# Patient Record
Sex: FEMALE | Race: BLACK OR AFRICAN AMERICAN | NOT HISPANIC OR LATINO | Employment: OTHER | URBAN - NONMETROPOLITAN AREA
[De-identification: names, ages, dates, MRNs, and addresses within clinical notes are randomized per-mention and may not be internally consistent; named-entity substitution may affect disease eponyms.]

---

## 2021-04-03 ENCOUNTER — OFFICE VISIT (OUTPATIENT)
Dept: INTERNAL MEDICINE | Facility: CLINIC | Age: 51
End: 2021-04-03

## 2021-04-03 VITALS
HEART RATE: 65 BPM | HEIGHT: 67 IN | WEIGHT: 192.6 LBS | DIASTOLIC BLOOD PRESSURE: 58 MMHG | OXYGEN SATURATION: 98 % | BODY MASS INDEX: 30.23 KG/M2 | TEMPERATURE: 97.8 F | SYSTOLIC BLOOD PRESSURE: 122 MMHG

## 2021-04-03 DIAGNOSIS — R06.2 WHEEZING: ICD-10-CM

## 2021-04-03 DIAGNOSIS — Z82.69: ICD-10-CM

## 2021-04-03 DIAGNOSIS — R13.10 DYSPHAGIA, UNSPECIFIED TYPE: ICD-10-CM

## 2021-04-03 DIAGNOSIS — R74.8 ELEVATED LIVER ENZYMES: Primary | ICD-10-CM

## 2021-04-03 DIAGNOSIS — R05.9 COUGH: ICD-10-CM

## 2021-04-03 PROCEDURE — 99204 OFFICE O/P NEW MOD 45 MIN: CPT | Performed by: FAMILY MEDICINE

## 2021-04-03 RX ORDER — MULTIPLE VITAMINS W/ MINERALS TAB 9MG-400MCG
1 TAB ORAL DAILY
COMMUNITY

## 2021-04-03 RX ORDER — FOLIC ACID 0.8 MG
TABLET ORAL
COMMUNITY

## 2021-04-03 NOTE — PROGRESS NOTES
Viviane Ba is a 51 y.o. female.    Chief Complaint   Patient presents with   • Liver Eval     Pt had elevated liver enzymes on her last set of lab work        HPI   Patient presents today to establish care.  Patient was previously scheduled to have copper IUD removed with D&C next week.  On pre-op labs cholesterol was a little elevated.  Liver enzymes were a little elevated as well. She denies RUQ pain, nausea, vomiting, reflux heartburn.  She follows a primarily vegetarian diet.  She does not take excessive amounts of Tylenol or any other particular medications.  She takes vitamin D, magnesium, multivitamin, and probiotics.  She does report a mild respiratory infection within the last week that seems to be improving.  She does admit to taking quite a bit of cough syrup and acetaminophen, but does not believe that she has overdosed on these medications.  Her procedure has now been pushed back to May 13.  Patient reports that gynecology would like to have medical clearance before proceeding with a D&C.    In addition, patient's mother does have a history of polymyositis.  Patient reports that mother passed secondary to complications of this disease.  Patient does have some joint aches and pains.  She also complains of occasional difficulty swallowing.  She reports that her brother has this issue as well.  She has never been screened for any autoimmune disorders.    The following portions of the patient's history were reviewed and updated as appropriate: allergies, current medications, past family history, past medical history, past social history, past surgical history and problem list.     Past Medical History:   Diagnosis Date   • Asthma     Childhood        Past Surgical History:   Procedure Laterality Date   • COLONOSCOPY      no polyps   • LEEP      cryo   • WISDOM TOOTH EXTRACTION         Family History   Problem Relation Age of Onset   • Diabetes Mother    • Polymyositis Mother    • Diabetes Father    •  Heart disease Father         CHF at age 50   • Hypertension Father    • Hyperlipidemia Father    • Kidney failure Father         secondary to medications   • Breast cancer Maternal Grandmother        Social History     Socioeconomic History   • Marital status:      Spouse name: Not on file   • Number of children: Not on file   • Years of education: Not on file   • Highest education level: Not on file   Tobacco Use   • Smoking status: Never Smoker   • Smokeless tobacco: Never Used   Substance and Sexual Activity   • Alcohol use: Yes     Comment: Occasional    • Drug use: Never   • Sexual activity: Defer       Allergies   Allergen Reactions   • Sulfa Antibiotics Rash     As a child         Current Outpatient Medications:   •  Cholecalciferol (DIALYVITE VITAMIN D 5000 PO), Take 10,000 Units by mouth., Disp: , Rfl:   •  Magnesium 500 MG capsule, Take  by mouth., Disp: , Rfl:   •  multivitamin with minerals (MULTIVITAMIN ADULT PO), Take 1 tablet by mouth Daily., Disp: , Rfl:   •  Probiotic Product (PROBIOTIC ADVANCED PO), Take  by mouth., Disp: , Rfl:     ROS    Review of Systems   Constitutional: Positive for unexpected weight gain. Negative for chills, fatigue and fever.   HENT: Negative for congestion, postnasal drip and sore throat.    Eyes: Negative for blurred vision and visual disturbance.   Respiratory: Positive for cough. Negative for shortness of breath and wheezing.    Cardiovascular: Negative for chest pain and leg swelling.   Gastrointestinal: Negative for abdominal pain, constipation, diarrhea, nausea and vomiting.   Endocrine: Positive for heat intolerance. Negative for cold intolerance.   Genitourinary: Positive for menstrual problem. Negative for dysuria and frequency.   Musculoskeletal: Positive for arthralgias (right hand). Negative for back pain.   Skin: Negative for color change and rash.   Allergic/Immunologic: Negative for environmental allergies.   Neurological: Negative for weakness,  "numbness and headache.   Hematological: Does not bruise/bleed easily.   Psychiatric/Behavioral: Negative for depressed mood. The patient is not nervous/anxious.        Vitals:    04/03/21 1409   BP: 122/58   BP Location: Left arm   Patient Position: Sitting   Cuff Size: Adult   Pulse: 65   Temp: 97.8 °F (36.6 °C)   TempSrc: Temporal   SpO2: 98%   Weight: 87.4 kg (192 lb 9.6 oz)   Height: 170.2 cm (67\")     Body mass index is 30.17 kg/m².    Physical Exam     Physical Exam  Constitutional:       General: She is not in acute distress.     Appearance: Normal appearance. She is well-developed.   HENT:      Head: Normocephalic and atraumatic.      Right Ear: Tympanic membrane and external ear normal.      Left Ear: Tympanic membrane and external ear normal.   Eyes:      Extraocular Movements: Extraocular movements intact.      Conjunctiva/sclera: Conjunctivae normal.      Pupils: Pupils are equal, round, and reactive to light.   Cardiovascular:      Rate and Rhythm: Normal rate and regular rhythm.      Heart sounds: No murmur heard.     Pulmonary:      Effort: Pulmonary effort is normal. No respiratory distress.      Breath sounds: Wheezing (Right upper lobe) present.   Abdominal:      General: Bowel sounds are normal. There is no distension.      Palpations: Abdomen is soft.      Tenderness: There is no abdominal tenderness.   Musculoskeletal:      Cervical back: Normal range of motion and neck supple.      Right lower leg: No edema.      Left lower leg: No edema.      Comments: Wrist brace present to right hand   Lymphadenopathy:      Cervical: No cervical adenopathy.   Skin:     General: Skin is warm and dry.      Findings: No rash.   Neurological:      Mental Status: She is alert and oriented to person, place, and time.      Cranial Nerves: No cranial nerve deficit.      Deep Tendon Reflexes: Reflexes normal.   Psychiatric:         Mood and Affect: Mood normal.         Behavior: Behavior normal. "         Assessment/Plan    Problem List Items Addressed This Visit     None      Visit Diagnoses     Elevated liver enzymes    -  Primary    Relevant Orders    Comprehensive Metabolic Panel    Hepatitis Panel, Acute    Family history of polymyositis        Relevant Orders    JORGE With / DsDNA, RNP, Sjogrens A / B, Lei    Wheezing        Relevant Orders    XR Chest PA & Lateral    Cough                Will obtain updated liver enzymes as well as an acute hepatitis panel.  Discussed that it sounds as if her liver enzymes are very minimally elevated.  Discussed that this may be fatty liver disease and very likely benign.  We will also obtain an JORGE level to screen for potential autoimmune disorder.  Discussed that dysphagia can very well be genetic.  As patient does have wheezing present on exam, will proceed with a chest x-ray to rule out underlying bronchitis and pneumonia as well.  She is due for her second Covid vaccine in less than a week.  Will obtain records from previous providers.  Patient reports that her gynecologist has records of everything from [place].  Patient is up-to-date on mammogram, Pap smear, and colonoscopy.      No orders of the defined types were placed in this encounter.      No orders of the defined types were placed in this encounter.      Return for Annual.      Tiffany Grimes,

## 2021-04-05 ENCOUNTER — HOSPITAL ENCOUNTER (OUTPATIENT)
Dept: GENERAL RADIOLOGY | Facility: HOSPITAL | Age: 51
Discharge: HOME | End: 2021-04-05
Admitting: FAMILY MEDICINE

## 2021-04-05 PROCEDURE — 71046 X-RAY EXAM CHEST 2 VIEWS: CPT

## 2021-04-06 LAB
ALBUMIN SERPL-MCNC: 4.3 G/DL (ref 3.5–5.2)
ALBUMIN/GLOB SERPL: 1.9 G/DL
ALP SERPL-CCNC: 107 U/L (ref 39–117)
ALT SERPL-CCNC: 42 U/L (ref 1–33)
ANA SER QL: NEGATIVE
AST SERPL-CCNC: 36 U/L (ref 1–32)
BILIRUB SERPL-MCNC: 0.3 MG/DL (ref 0–1.2)
BUN SERPL-MCNC: 10 MG/DL (ref 6–20)
BUN/CREAT SERPL: 13.7 (ref 7–25)
CALCIUM SERPL-MCNC: 9.9 MG/DL (ref 8.6–10.5)
CHLORIDE SERPL-SCNC: 105 MMOL/L (ref 98–107)
CO2 SERPL-SCNC: 24.4 MMOL/L (ref 22–29)
CREAT SERPL-MCNC: 0.73 MG/DL (ref 0.57–1)
GLOBULIN SER CALC-MCNC: 2.3 GM/DL
GLUCOSE SERPL-MCNC: 83 MG/DL (ref 65–99)
HAV IGM SERPL QL IA: NEGATIVE
HBV CORE IGM SERPL QL IA: NEGATIVE
HBV SURFACE AG SERPL QL IA: NEGATIVE
HCV AB S/CO SERPL IA: <0.1 S/CO RATIO (ref 0–0.9)
POTASSIUM SERPL-SCNC: 4.5 MMOL/L (ref 3.5–5.2)
PROT SERPL-MCNC: 6.6 G/DL (ref 6–8.5)
SODIUM SERPL-SCNC: 141 MMOL/L (ref 136–145)

## 2021-04-20 ENCOUNTER — TELEPHONE (OUTPATIENT)
Dept: INTERNAL MEDICINE | Facility: CLINIC | Age: 51
End: 2021-04-20

## 2021-04-20 NOTE — TELEPHONE ENCOUNTER
Caller: Viviane Jeronimo    Relationship: Self    Best call back number: [phone]    What is the best time to reach you: ANYTIME    Who are you requesting to speak with (clinical staff, provider,  specific staff member): CLINICAL      What was the call regarding: PT CALLED REQUESTING PROCEDURE CLEARANCE LETTER BE SENT TO DR. IBANEZ  PHONE NUMBER: [phone]      Do you require a callback: YES

## 2021-04-20 NOTE — TELEPHONE ENCOUNTER
Patient states she already completed a preop visit on April 3rd and had labs done. She stated dr montilla had drafted a clearance letter and needs this and her labs forwarded to dr brown office.

## 2021-04-21 NOTE — TELEPHONE ENCOUNTER
It was not a preop exam.  That was not how the visit was scheduled, nor was it stated she needed an actual preop clearance exam.  She reported pre op labs were done by the specialist and she was concerned about liver function tests being elevated.  This was repeated and stated in a letter that I do not feel concerned with the slight elevation of liver enzymes and should not interfere with her ability to undergo surgery.  We can fax the letter, though already supplied to the patient, and the labs that I ordered.  However, if they are needing a pre op exam done with specific testing, this may have to be done at a separate appointment.

## 2021-07-19 ENCOUNTER — OFFICE VISIT (OUTPATIENT)
Dept: INTERNAL MEDICINE | Facility: CLINIC | Age: 51
End: 2021-07-19

## 2021-07-19 VITALS
HEART RATE: 81 BPM | HEIGHT: 67 IN | OXYGEN SATURATION: 98 % | SYSTOLIC BLOOD PRESSURE: 126 MMHG | BODY MASS INDEX: 29.29 KG/M2 | RESPIRATION RATE: 16 BRPM | TEMPERATURE: 98.8 F | DIASTOLIC BLOOD PRESSURE: 80 MMHG | WEIGHT: 186.6 LBS

## 2021-07-19 DIAGNOSIS — K21.9 GASTROESOPHAGEAL REFLUX DISEASE, UNSPECIFIED WHETHER ESOPHAGITIS PRESENT: ICD-10-CM

## 2021-07-19 DIAGNOSIS — R13.10 DYSPHAGIA, UNSPECIFIED TYPE: Primary | ICD-10-CM

## 2021-07-19 PROCEDURE — 99213 OFFICE O/P EST LOW 20 MIN: CPT | Performed by: FAMILY MEDICINE

## 2021-07-19 RX ORDER — OMEPRAZOLE 20 MG/1
20 CAPSULE, DELAYED RELEASE ORAL DAILY
Qty: 90 CAPSULE | Refills: 1 | Status: SHIPPED | OUTPATIENT
Start: 2021-07-19 | End: 2022-06-23

## 2021-07-19 NOTE — PROGRESS NOTES
Viviane Contreras is a 51 y.o. female.    Chief Complaint   Patient presents with   • Difficulty Swallowing     pt reports that this has been ongoing for years and pt also has family members that have the same issues   • Referral - Donor Txp     pt would like to be referred to Dr Parul Shook [facility]       HPI   Patient reports trouble swallowing that has become more pronounced over the last few months.  She reports every time she eats she feels as if food is getting stuck.   She Last week she reports she woke up and felt like she aspirated.  Not taking anything for this issue.  She rarely takes something OTC.  Denies trouble liquids except when she is having trouble swallowing solids already.  She reports dry spices tend to trigger.  She states multiple family members have had to have their esophagus stretched.  She does report upper belly bloating.  She would like to see GI for possible scope.     The following portions of the patient's history were reviewed and updated as appropriate: allergies, current medications, past family history, past medical history, past social history, past surgical history and problem list.     Allergies   Allergen Reactions   • Sulfa Antibiotics Rash     As a child         Current Outpatient Medications:   •  Cholecalciferol (DIALYVITE VITAMIN D 5000 PO), Take 10,000 Units by mouth., Disp: , Rfl:   •  Magnesium 500 MG capsule, Take  by mouth., Disp: , Rfl:   •  multivitamin with minerals (MULTIVITAMIN ADULT PO), Take 1 tablet by mouth Daily., Disp: , Rfl:   •  Probiotic Product (PROBIOTIC ADVANCED PO), Take  by mouth., Disp: , Rfl:   •  omeprazole (PrilOSEC) 20 MG capsule, Take 1 capsule by mouth Daily., Disp: 90 capsule, Rfl: 1    ROS    Review of Systems   Constitutional: Negative for chills and fever.   Respiratory: Negative for shortness of breath.    Cardiovascular: Negative for chest pain.   Gastrointestinal: Positive for vomiting (induced) and GERD. Negative for  "abdominal pain, constipation, diarrhea and nausea.       Vitals:    07/19/21 1039   BP: 126/80   BP Location: Left arm   Patient Position: Sitting   Cuff Size: Adult   Pulse: 81   Resp: 16   Temp: 98.8 °F (37.1 °C)   TempSrc: Temporal   SpO2: 98%   Weight: 84.6 kg (186 lb 9.6 oz)   Height: 170.2 cm (67\")     Body mass index is 29.23 kg/m².    Physical Exam     Physical Exam  Constitutional:       General: She is not in acute distress.     Appearance: Normal appearance. She is well-developed.   HENT:      Head: Normocephalic and atraumatic.      Right Ear: External ear normal.      Left Ear: External ear normal.   Eyes:      Extraocular Movements: Extraocular movements intact.      Conjunctiva/sclera: Conjunctivae normal.   Cardiovascular:      Rate and Rhythm: Normal rate and regular rhythm.      Heart sounds: No murmur heard.     Pulmonary:      Effort: Pulmonary effort is normal. No respiratory distress.      Breath sounds: Normal breath sounds. No wheezing.   Abdominal:      General: Bowel sounds are normal. There is no distension.      Palpations: Abdomen is soft.      Tenderness: There is no abdominal tenderness.   Skin:     General: Skin is warm and dry.   Neurological:      Mental Status: She is alert and oriented to person, place, and time.      Cranial Nerves: No cranial nerve deficit.   Psychiatric:         Mood and Affect: Mood normal.         Behavior: Behavior normal.         Assessment/Plan    Problems Addressed this Visit     None      Visit Diagnoses     Dysphagia, unspecified type    -  Primary    Relevant Orders    Ambulatory Referral to Gastroenterology (Completed)    Gastroesophageal reflux disease, unspecified whether esophagitis present        Relevant Medications    omeprazole (PrilOSEC) 20 MG capsule    Other Relevant Orders    Ambulatory Referral to Gastroenterology (Completed)        Patient is being referred to gastroenterology for further evaluation. In the meantime, we will go ahead and " start the patient on Prilosec.    New Medications Ordered This Visit   Medications   • omeprazole (PrilOSEC) 20 MG capsule     Sig: Take 1 capsule by mouth Daily.     Dispense:  90 capsule     Refill:  1       No orders of the defined types were placed in this encounter.      Return in about 3 months (around 10/19/2021) for Annual.    Tiffany Grimes, DO

## 2021-10-20 ENCOUNTER — OFFICE VISIT (OUTPATIENT)
Dept: INTERNAL MEDICINE | Facility: CLINIC | Age: 51
End: 2021-10-20

## 2021-10-20 VITALS
TEMPERATURE: 98 F | HEIGHT: 67 IN | SYSTOLIC BLOOD PRESSURE: 120 MMHG | BODY MASS INDEX: 30.17 KG/M2 | WEIGHT: 192.2 LBS | OXYGEN SATURATION: 98 % | DIASTOLIC BLOOD PRESSURE: 82 MMHG | HEART RATE: 74 BPM

## 2021-10-20 DIAGNOSIS — Z13.220 LIPID SCREENING: ICD-10-CM

## 2021-10-20 DIAGNOSIS — Z00.00 WELL ADULT EXAM: Primary | ICD-10-CM

## 2021-10-20 PROCEDURE — 90715 TDAP VACCINE 7 YRS/> IM: CPT | Performed by: FAMILY MEDICINE

## 2021-10-20 PROCEDURE — 90471 IMMUNIZATION ADMIN: CPT | Performed by: FAMILY MEDICINE

## 2021-10-20 PROCEDURE — 99396 PREV VISIT EST AGE 40-64: CPT | Performed by: FAMILY MEDICINE

## 2021-10-20 NOTE — ASSESSMENT & PLAN NOTE
Unremarkable PE findings.  Discussed routine health maintenance including:  Vaccines, dental/eye health, health diet and exercise, pap smear, mammograms, colorectal cancer screening. Mental health addressed today as well.  Tdap administered.  Encouraged shingrix vaccine to be given at her local pharmacy.  Will obtain record of mammogram from gynecology.  Reviewed pap results and recent labs from gynecology.

## 2021-10-20 NOTE — PROGRESS NOTES
Viviane Contreras is a 51 y.o. female.    Chief Complaint   Patient presents with   • Annual Exam       HPI   Patient presents today for annual physical exam.  She recently had an EDG and colonoscopy.  Patient had polyp removed from colon.  EGD did show schlatzki's ring and hiatal hernia.  Reflux had resolved with daily omeprazole.   Has had 2 COVID vaccines.  Declines flu vaccine.  Agreeable to tdap.  Not receive shingrix vaccine yet.   Had recent eye exam.  Up to date on dental exams.  Going to have tooth extracted.  She is exercising.  She is trying to lose about 20lbs.  She eating healthy. She is wanting to have a COVID antibody test. She is up to date on pap smears and mammogram through Dr. Tai's office, which were done earlier this year.  She is menopausal and has been having hot flashes.  Also reports mid back pain and is seeing a chiropractor and massage therapist with some improvement.     The following portions of the patient's history were reviewed and updated as appropriate: allergies, current medications, past family history, past medical history, past social history, past surgical history and problem list.     Past Medical History:   Diagnosis Date   • Asthma     Childhood        Past Surgical History:   Procedure Laterality Date   • COLONOSCOPY      no polyps   • LEEP      cryo   • WISDOM TOOTH EXTRACTION         Family History   Problem Relation Age of Onset   • Diabetes Mother    • Polymyositis Mother    • Diabetes Father    • Heart disease Father         CHF at age 50   • Hypertension Father    • Hyperlipidemia Father    • Kidney failure Father         secondary to medications   • Breast cancer Maternal Grandmother        Social History     Socioeconomic History   • Marital status:    Tobacco Use   • Smoking status: Never Smoker   • Smokeless tobacco: Never Used   Vaping Use   • Vaping Use: Never used   Substance and Sexual Activity   • Alcohol use: Yes     Comment: Occasional    •  "Drug use: Never   • Sexual activity: Defer       Allergies   Allergen Reactions   • Sulfa Antibiotics Rash     As a child         Current Outpatient Medications:   •  Cholecalciferol (DIALYVITE VITAMIN D 5000 PO), Take 10,000 Units by mouth., Disp: , Rfl:   •  Magnesium 500 MG capsule, Take  by mouth., Disp: , Rfl:   •  multivitamin with minerals (MULTIVITAMIN ADULT PO), Take 1 tablet by mouth Daily., Disp: , Rfl:   •  omeprazole (PrilOSEC) 20 MG capsule, Take 1 capsule by mouth Daily., Disp: 90 capsule, Rfl: 1  •  Probiotic Product (PROBIOTIC ADVANCED PO), Take  by mouth., Disp: , Rfl:     ROS    Review of Systems   Constitutional: Negative for chills, fatigue and fever.   HENT: Negative for congestion, postnasal drip and sore throat.    Eyes: Negative for blurred vision and visual disturbance.   Respiratory: Negative for cough and shortness of breath.    Cardiovascular: Negative for chest pain and leg swelling.   Gastrointestinal: Negative for abdominal pain, constipation, diarrhea, nausea and vomiting.   Endocrine: Positive for heat intolerance. Negative for cold intolerance.   Genitourinary: Negative for dysuria and frequency.        Menopausal syndrome   Musculoskeletal: Positive for back pain.   Skin: Negative for color change and rash.   Allergic/Immunologic: Negative for environmental allergies.   Neurological: Negative for weakness, numbness and headache.   Hematological: Does not bruise/bleed easily.   Psychiatric/Behavioral: Negative for depressed mood. The patient is not nervous/anxious.        Vitals:    10/20/21 1043   BP: 120/82   Pulse: 74   Temp: 98 °F (36.7 °C)   SpO2: 98%   Weight: 87.2 kg (192 lb 3.2 oz)   Height: 170.2 cm (67\")   PainSc: 0-No pain     Body mass index is 30.1 kg/m².    Physical Exam     Physical Exam  Constitutional:       General: She is not in acute distress.     Appearance: Normal appearance. She is well-developed.   HENT:      Head: Normocephalic and atraumatic.      Right " Ear: Tympanic membrane and external ear normal.      Left Ear: Tympanic membrane and external ear normal.   Eyes:      Extraocular Movements: Extraocular movements intact.      Conjunctiva/sclera: Conjunctivae normal.      Pupils: Pupils are equal, round, and reactive to light.   Cardiovascular:      Rate and Rhythm: Normal rate and regular rhythm.      Heart sounds: No murmur heard.      Pulmonary:      Effort: Pulmonary effort is normal. No respiratory distress.      Breath sounds: Normal breath sounds. No wheezing.   Abdominal:      General: Bowel sounds are normal. There is no distension.      Palpations: Abdomen is soft.      Tenderness: There is no abdominal tenderness.   Musculoskeletal:      Cervical back: Normal range of motion and neck supple.      Right lower leg: No edema.      Left lower leg: No edema.      Comments: Somatic dysfunction of lower thoracic spine (group dysfunction)   Lymphadenopathy:      Cervical: No cervical adenopathy.   Skin:     General: Skin is warm and dry.   Neurological:      Mental Status: She is alert and oriented to person, place, and time.      Cranial Nerves: No cranial nerve deficit.   Psychiatric:         Mood and Affect: Mood normal.         Behavior: Behavior normal.         Assessment/Plan    Problems Addressed this Visit        Health Encounters    Well adult exam - Primary     Unremarkable PE findings.  Discussed routine health maintenance including:  Vaccines, dental/eye health, health diet and exercise, pap smear, mammograms, colorectal cancer screening. Mental health addressed today as well.  Tdap administered.  Encouraged shingrix vaccine to be given at her local pharmacy.  Will obtain record of mammogram from gynecology.  Reviewed pap results and recent labs from gynecology.            Other Visit Diagnoses     Lipid screening        Relevant Orders    Lipid Panel        No orders of the defined types were placed in this encounter.      Orders Placed This  Encounter   Procedures   • Tdap Vaccine Greater Than or Equal To 6yo IM       Return in about 1 year (around 10/20/2022) for Annual physical.      Tiffany Grimes, DO

## 2021-10-21 LAB
CHOLEST SERPL-MCNC: 202 MG/DL (ref 0–200)
HDLC SERPL-MCNC: 51 MG/DL (ref 40–60)
LDLC SERPL CALC-MCNC: 123 MG/DL (ref 0–100)
TRIGL SERPL-MCNC: 160 MG/DL (ref 0–150)
VLDLC SERPL CALC-MCNC: 28 MG/DL (ref 5–40)

## 2021-11-11 ENCOUNTER — LAB REQUISITION (OUTPATIENT)
Dept: LAB | Facility: HOSPITAL | Age: 51
End: 2021-11-11

## 2021-11-11 DIAGNOSIS — M65.30 TRIGGER FINGER, UNSPECIFIED FINGER: ICD-10-CM

## 2021-11-11 PROCEDURE — 88307 TISSUE EXAM BY PATHOLOGIST: CPT | Performed by: PLASTIC SURGERY

## 2021-11-12 LAB
CYTO UR: NORMAL
LAB AP CASE REPORT: NORMAL
LAB AP CLINICAL INFORMATION: NORMAL
PATH REPORT.FINAL DX SPEC: NORMAL
PATH REPORT.GROSS SPEC: NORMAL

## 2022-02-08 ENCOUNTER — TELEPHONE (OUTPATIENT)
Dept: INTERNAL MEDICINE | Facility: CLINIC | Age: 52
End: 2022-02-08

## 2022-02-08 NOTE — TELEPHONE ENCOUNTER
Caller: Viviane Jeronimo    Relationship: Self    Best call back number: [phone]    What orders are you requesting (i.e. lab or imaging): CERVICAL MRI    In what timeframe would the patient need to come in: ASAP    Additional notes: PATIENT SAW DR KNAPP AND DR KNAPP SENT HER TO NEUROLOGY. NEUROLOGY NOW RECOMMENDS CERVICAL MRI. NEUROLOGIST IS SENDING OVER ALL REPORTS ON THE REASON WHY PATIENT NEEDS THIS MRI. PATIENT THOUGHT THAT NEUROLOGIST WAS ORDERING THE MRI SO THIS HAS GONE OVER A WEEK AND JUST FOUND OUT THAT DR LUCAS NEEDS TO ORDER THIS. PATIENT NEEDS THIS MRI ASAP. PLEASE CALL PATIENT WHEN ORDERED AND SCHEDULED.

## 2022-02-09 NOTE — TELEPHONE ENCOUNTER
Called patient, her neurologist, and her surgeon both do not want to send her for this MRI, but they recommend it. I did tell the patient we can not do the MRI form, because we did not treat her for this. I did let her know she would have to call her doctors back and talk to them about it.

## 2022-02-14 NOTE — TELEPHONE ENCOUNTER
Called and spoke to laly about not being able to do an MRI for her, her neurologist or her surgeon will have to do the MRI. And I recommend her calling them back, and speak to someone about it.

## 2022-02-14 NOTE — TELEPHONE ENCOUNTER
Patient called asking about the MRI order.  She does not understand why it's not being ordered. Please advise Phone number verified.

## 2022-02-16 ENCOUNTER — TELEPHONE (OUTPATIENT)
Dept: INTERNAL MEDICINE | Facility: CLINIC | Age: 52
End: 2022-02-16

## 2022-02-16 NOTE — TELEPHONE ENCOUNTER
Sheela from [facility](?) called and wanted to know if the office has received the records on Ms. Contreras? She also stated that pt needs a C spine MRI. States her issues are not coming from her hand but probably from her neck. [phone]

## 2022-02-24 ENCOUNTER — HOSPITAL ENCOUNTER (OUTPATIENT)
Dept: GENERAL RADIOLOGY | Facility: HOSPITAL | Age: 52
Discharge: HOME | End: 2022-02-24
Admitting: FAMILY MEDICINE

## 2022-02-24 ENCOUNTER — OFFICE VISIT (OUTPATIENT)
Dept: INTERNAL MEDICINE | Facility: CLINIC | Age: 52
End: 2022-02-24

## 2022-02-24 VITALS
HEART RATE: 77 BPM | TEMPERATURE: 97.1 F | OXYGEN SATURATION: 98 % | HEIGHT: 67 IN | SYSTOLIC BLOOD PRESSURE: 128 MMHG | WEIGHT: 195 LBS | BODY MASS INDEX: 30.61 KG/M2 | DIASTOLIC BLOOD PRESSURE: 82 MMHG

## 2022-02-24 DIAGNOSIS — G89.29 CHRONIC NECK PAIN: Primary | ICD-10-CM

## 2022-02-24 DIAGNOSIS — M54.2 CHRONIC NECK PAIN: Primary | ICD-10-CM

## 2022-02-24 DIAGNOSIS — R20.2 PARESTHESIA OF ARM: ICD-10-CM

## 2022-02-24 PROCEDURE — 99213 OFFICE O/P EST LOW 20 MIN: CPT | Performed by: FAMILY MEDICINE

## 2022-02-24 PROCEDURE — 72040 X-RAY EXAM NECK SPINE 2-3 VW: CPT

## 2022-02-24 NOTE — PROGRESS NOTES
"Viviane Contreras is a 52 y.o. female.    Chief Complaint   Patient presents with   • Abstract     MRI of \"Cervical Spine\"       HPI   Patient complains of constant neck pain for at least 1 year.  Pain can be aching, sharp, stabbing, pinching.  Admits to tingling, numbness and stiffness down arms bilaterally, primarily to right arm.  She wakes up with numbness.   She reports trouble getting grandson out of car seat.  She has decreased  strength.   She takes tylenol and ibuprofen prn pain and sometimes does not help.      She had x-rays of cervical spine by chiropractor in 8/2021.  She has consistently seen a chiropractor and has seen a massage therapist. 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  She did PT once at the hand surgery office and then did exercises at home.  She is still doing exercises at home every day.      The following portions of the patient's history were reviewed and updated as appropriate: allergies, current medications, past family history, past medical history, past social history, past surgical history and problem list.     Allergies   Allergen Reactions   • Sulfa Antibiotics Rash     As a child         Current Outpatient Medications:   •  Cholecalciferol (DIALYVITE VITAMIN D 5000 PO), Take 10,000 Units by mouth., Disp: , Rfl:   •  Magnesium 500 MG capsule, Take  by mouth., Disp: , Rfl:   •  multivitamin with minerals (MULTIVITAMIN ADULT PO), Take 1 tablet by mouth Daily., Disp: , Rfl:   •  omeprazole (PrilOSEC) 20 MG capsule, Take 1 capsule by mouth Daily., Disp: 90 capsule, Rfl: 1  •  Probiotic Product (PROBIOTIC ADVANCED PO), Take  by mouth., Disp: , Rfl:     ROS    Review of Systems   Constitutional: Negative for chills and fever.   Respiratory: Negative for cough and shortness of breath.    Cardiovascular: Negative for chest pain.   Gastrointestinal: Negative for constipation, diarrhea, nausea and vomiting.   Musculoskeletal: Positive for neck pain.   Neurological: " "Positive for weakness (neck, right arm worse than left, left jaw) and numbness.       Vitals:    02/24/22 1512   BP: 128/82   Pulse: 77   Temp: 97.1 °F (36.2 °C)   SpO2: 98%   Weight: 88.5 kg (195 lb)   Height: 170.2 cm (67\")     Body mass index is 30.54 kg/m².    Physical Exam     Physical Exam  Constitutional:       General: She is not in acute distress.     Appearance: Normal appearance. She is well-developed.   HENT:      Head: Normocephalic and atraumatic.      Right Ear: Tympanic membrane and external ear normal.      Left Ear: Tympanic membrane and external ear normal.   Eyes:      Extraocular Movements: Extraocular movements intact.      Conjunctiva/sclera: Conjunctivae normal.   Cardiovascular:      Rate and Rhythm: Normal rate and regular rhythm.      Heart sounds: No murmur heard.      Pulmonary:      Effort: Pulmonary effort is normal. No respiratory distress.      Breath sounds: Normal breath sounds. No wheezing.   Abdominal:      General: Bowel sounds are normal. There is no distension.      Palpations: Abdomen is soft.      Tenderness: There is no abdominal tenderness.   Musculoskeletal:      Cervical back: Neck supple. Spasms and tenderness present. Decreased range of motion (sidebending, extension).   Lymphadenopathy:      Cervical: No cervical adenopathy.   Skin:     Coloration: Skin is not pale.   Neurological:      Mental Status: She is alert and oriented to person, place, and time.      Cranial Nerves: No cranial nerve deficit.   Psychiatric:         Mood and Affect: Mood normal.         Behavior: Behavior normal.         Assessment/Plan    Problems Addressed this Visit     None      Visit Diagnoses     Chronic neck pain    -  Primary    Relevant Orders    XR spine cervical 3 vw (Completed)    Paresthesia of arm        Relevant Orders    XR spine cervical 3 vw (Completed)        Records from hand surgeon and neurology have been reviewed.  Nerve conduction study suggested additional imaging of " C-spine.  Despite patient already having x-rays done of C-spine with chiropractor, will obtain updated x-rays.  As above, she has completed chiropractic therapy and physical therapy already.  If x-ray is negative, will we will plan to proceed with MRI of the C-spine.    No orders of the defined types were placed in this encounter.      No orders of the defined types were placed in this encounter.      Return if symptoms worsen or fail to improve.    Tiffany Grimes, DO

## 2022-02-25 ENCOUNTER — PATIENT MESSAGE (OUTPATIENT)
Dept: INTERNAL MEDICINE | Facility: CLINIC | Age: 52
End: 2022-02-25

## 2022-02-25 DIAGNOSIS — M47.22 OSTEOARTHRITIS OF SPINE WITH RADICULOPATHY, CERVICAL REGION: ICD-10-CM

## 2022-02-25 DIAGNOSIS — M50.30 DEGENERATIVE CERVICAL DISC: ICD-10-CM

## 2022-02-25 DIAGNOSIS — G89.29 CHRONIC NECK PAIN: Primary | ICD-10-CM

## 2022-02-25 DIAGNOSIS — R20.2 PARESTHESIA OF ARM: ICD-10-CM

## 2022-02-25 DIAGNOSIS — M54.2 CHRONIC NECK PAIN: Primary | ICD-10-CM

## 2022-02-28 ENCOUNTER — TELEPHONE (OUTPATIENT)
Dept: INTERNAL MEDICINE | Facility: CLINIC | Age: 52
End: 2022-02-28

## 2022-03-25 ENCOUNTER — HOSPITAL ENCOUNTER (OUTPATIENT)
Dept: MRI IMAGING | Facility: HOSPITAL | Age: 52
Discharge: HOME | End: 2022-03-25
Admitting: FAMILY MEDICINE

## 2022-03-25 PROCEDURE — 72141 MRI NECK SPINE W/O DYE: CPT

## 2022-03-26 ENCOUNTER — PATIENT MESSAGE (OUTPATIENT)
Dept: INTERNAL MEDICINE | Facility: CLINIC | Age: 52
End: 2022-03-26

## 2022-03-26 DIAGNOSIS — M54.2 CHRONIC NECK PAIN: ICD-10-CM

## 2022-03-26 DIAGNOSIS — R20.2 PARESTHESIA OF ARM: Primary | ICD-10-CM

## 2022-03-26 DIAGNOSIS — M47.22 OSTEOARTHRITIS OF SPINE WITH RADICULOPATHY, CERVICAL REGION: ICD-10-CM

## 2022-03-26 DIAGNOSIS — G89.29 CHRONIC NECK PAIN: ICD-10-CM

## 2022-03-28 ENCOUNTER — TELEPHONE (OUTPATIENT)
Dept: INTERNAL MEDICINE | Facility: CLINIC | Age: 52
End: 2022-03-28

## 2022-03-28 NOTE — TELEPHONE ENCOUNTER
From: Viviane Mendosa  To: Tiffany Grimes DO  Sent: 3/26/2022 9:58 AM EDT  Subject: MRI result and Referral     Good morning Dr. Grimes,  I have Reviewed the MRI results and I would like a referral please.  Dr. Gabriel Kiran MD in [place]. He is a neurologist. Specializing in neck and spine.  I don’t think this should require my coming back in for another appointment if possible if your office could send the referral request I would appreciate it.   I have an appointment set for the 31st but would prefer to not make the trip to [place] if we can handle it here.   Please advise,  Thank you for your assistance.  Viviane Contreras

## 2022-03-28 NOTE — TELEPHONE ENCOUNTER
Called and informed patient that [employer] message had been sent to Dr. Grimes. Informed patient to keep appointment until she heard back from Dr. Grimes.

## 2022-03-28 NOTE — TELEPHONE ENCOUNTER
Caller: Viviane Mendosa    Relationship: Self    Best call back number: [phone]    What is the best time to reach you: ANYTIME    Who are you requesting to speak with (clinical staff, provider,  specific staff member): CLINICAL STAFF    Do you know the name of the person who called: NA   What was the call regarding: MESSAGE ON MY CHART REQUESTING A REFERRAL AND NOT HAVE TO COME INTO OFFICE JUST FOR A REFERRAL    PATIENT HAS APPOINTMENT Wednesday 03/30/22    Do you require a callback: YES

## 2022-03-31 NOTE — TELEPHONE ENCOUNTER
Sending message to Dr. Grimes as she has not seen this message to be able to order a Neurosurgery consult.

## 2022-05-20 ENCOUNTER — OFFICE VISIT (OUTPATIENT)
Dept: NEUROSURGERY | Facility: CLINIC | Age: 52
End: 2022-05-20

## 2022-05-20 VITALS
HEIGHT: 67 IN | WEIGHT: 195 LBS | BODY MASS INDEX: 30.61 KG/M2 | SYSTOLIC BLOOD PRESSURE: 110 MMHG | DIASTOLIC BLOOD PRESSURE: 78 MMHG

## 2022-05-20 DIAGNOSIS — M54.2 CHRONIC NECK PAIN: ICD-10-CM

## 2022-05-20 DIAGNOSIS — R20.2 NUMBNESS AND TINGLING IN BOTH HANDS: ICD-10-CM

## 2022-05-20 DIAGNOSIS — M47.812 CERVICAL SPONDYLOSIS WITHOUT MYELOPATHY: Primary | ICD-10-CM

## 2022-05-20 DIAGNOSIS — M50.30 DDD (DEGENERATIVE DISC DISEASE), CERVICAL: ICD-10-CM

## 2022-05-20 DIAGNOSIS — R20.0 NUMBNESS AND TINGLING IN BOTH HANDS: ICD-10-CM

## 2022-05-20 DIAGNOSIS — G89.29 CHRONIC NECK PAIN: ICD-10-CM

## 2022-05-20 DIAGNOSIS — M47.22 RADICULOPATHY DUE TO CERVICAL SPONDYLOSIS: ICD-10-CM

## 2022-05-20 PROCEDURE — 99204 OFFICE O/P NEW MOD 45 MIN: CPT | Performed by: NEUROLOGICAL SURGERY

## 2022-05-20 NOTE — PROGRESS NOTES
NAME: MIGDALIA ANN   DOS: 2022  : 1970  PCP: Tiffany Grimes DO    Chief Complaint: Right-sided arm pain and hand numbness  History of Present Illness:  52 y.o. female   This is a 52-year-old active female the presents with a history of some chronic axial neck pain the pain is in the midline its worse with rotations she is noted that it is gotten worse.  She has difficulty with her right upper extremity she notices numbness and some pain in her right hand she had a recent surgery on her right thumb and underwent thorough evaluation with EMG nerve conduction study after the surgery for evaluation of proximal arm pain.  She has been seen by neurology.  The pain occurs daily its associated with whole hand numbness.  Its worse at night.  She had concerns and anxiety about paralysis secondary to cord compression    She is here for evaluation    PMHX  Allergies:  Allergies   Allergen Reactions   • Sulfa Antibiotics Rash     As a child     Medications    Current Outpatient Medications:   •  Cholecalciferol (DIALYVITE VITAMIN D 5000 PO), Take 10,000 Units by mouth., Disp: , Rfl:   •  Magnesium 500 MG capsule, Take  by mouth., Disp: , Rfl:   •  multivitamin with minerals (MULTIVITAMIN ADULT PO), Take 1 tablet by mouth Daily., Disp: , Rfl:   •  omeprazole (PrilOSEC) 20 MG capsule, Take 1 capsule by mouth Daily., Disp: 90 capsule, Rfl: 1  •  Probiotic Product (PROBIOTIC ADVANCED PO), Take  by mouth., Disp: , Rfl:   Past Medical History:  Past Medical History:   Diagnosis Date   • Asthma     Childhood    • Cervical disc disorder    • Low back pain      Past Surgical History:  Past Surgical History:   Procedure Laterality Date   • COLONOSCOPY      no polyps   • LEEP      cryo   • WISDOM TOOTH EXTRACTION       Social Hx:  Social History     Tobacco Use   • Smoking status: Never Smoker   • Smokeless tobacco: Never Used   Vaping Use   • Vaping Use: Never used   Substance Use Topics   • Alcohol  use: Not Currently     Comment: Rare social drinker   • Drug use: Never     Family Hx:  Family History   Problem Relation Age of Onset   • Diabetes Mother    • Polymyositis Mother    • Diabetes Father    • Heart disease Father    • Hypertension Father    • Hyperlipidemia Father    • Kidney failure Father         secondary to medications   • Breast cancer Maternal Grandmother      Review of Systems:        Review of Systems   Constitutional: Negative for activity change, appetite change, chills, diaphoresis, fatigue, fever and unexpected weight change.   HENT: Negative for congestion, dental problem, drooling, ear discharge, ear pain, facial swelling, hearing loss, mouth sores, nosebleeds, postnasal drip, rhinorrhea, sinus pressure, sneezing, sore throat, tinnitus, trouble swallowing and voice change.    Eyes: Negative for photophobia, pain, discharge, redness, itching and visual disturbance.   Respiratory: Negative for apnea, cough, choking, chest tightness, shortness of breath, wheezing and stridor.    Cardiovascular: Negative for chest pain, palpitations and leg swelling.   Gastrointestinal: Negative for abdominal distention, abdominal pain, anal bleeding, blood in stool, constipation, diarrhea, nausea, rectal pain and vomiting.   Endocrine: Negative for cold intolerance, heat intolerance, polydipsia, polyphagia and polyuria.   Genitourinary: Negative for decreased urine volume, difficulty urinating, dysuria, enuresis, flank pain, frequency, genital sores, hematuria and urgency.   Musculoskeletal: Positive for neck pain and neck stiffness. Negative for arthralgias, back pain, gait problem, joint swelling and myalgias.   Skin: Negative for color change, pallor, rash and wound.   Allergic/Immunologic: Negative for environmental allergies, food allergies and immunocompromised state.   Neurological: Positive for weakness and numbness. Negative for dizziness, tremors, seizures, syncope, facial asymmetry, speech  difficulty, light-headedness and headaches.   Hematological: Negative for adenopathy. Does not bruise/bleed easily.   Psychiatric/Behavioral: Negative for agitation, behavioral problems, confusion, decreased concentration, dysphoric mood, hallucinations, self-injury, sleep disturbance and suicidal ideas. The patient is not nervous/anxious and is not hyperactive.    All other systems reviewed and are negative.       I have reviewed this note template and all pertinent parts of the review of systems social, family history, surgical history and medication list    Physical Examination:  There were no vitals filed for this visit.   General Appearance:   Well developed, well nourished, well groomed, alert, and cooperative.  Neurological examination:  Neurologic Exam  Vital signs were reviewed and documented in the chart  Patient appeared in good neurologic function with normal comprehension fluent speech  Mood and affect are normal  Sense of smell deferred    COVID mask left in place no evidence of  Muscle bulk and tone normal  5 out of 5 strength no motor drift  Gait normal intact  Negative Romberg  No clonus long tract signs or myelopathy  No winging of the scapula positive trace Tinel's bilaterally  Reflexes symmetric  No edema noted and extremities skin appears normal  Arms are warm and well perfused          Review of Imaging/DATA:  I personally reviewed the MRI of the cervical spine she is got what I would classify as mild degenerative changes at the C5-6 area predominantly the spinal cord is widely patent there is no signal change she has had a chest x-ray that demonstrates no apical masses recently I reviewed these films personally as well as the reports    EMG nerve conduction study showed an EMG and nerve conduction study that was normal of the right upper extremity  Diagnoses/Plan:    Ms. Mendosa is a 52 y.o. female   1.  Cervical degenerative changes she undoubtedly has some facet arthropathy and some  "degenerative changes that I would classify radiographically as mild.  She is highly symptomatic from them.  I recommended physical therapy ongoing anti-inflammatory diets lifestyle modification etc. I explained that there really should not be a lot to \"worry about \"she has a lot of anxiety regarding her symptomatology.  I do think her symptoms are severe enough to warrant a relationship with an interventional list.  She really does not want to undergo intervention but I suspect that a transforaminal block at C5-6 or epidural steroid block or facet blocks may assist with control of her pain.    2.  Arthritic symptomatology bilateral neck shoulders and arms she is undergone rheumatologic work-up perhaps revisiting this in the fall may be in order with her primary care doctor I talked to her about that    3.  Diet lifestyle activity modifications as explained    4.  Slight carpal tunnel right side as evidenced by positive Tinel's likely a cause of some of her nighttime numbness even though the EMG nerve conduction study is less unrevealing.  She can contemplate physical therapy and cock up wrist splinting on the right    If the symptoms persist for 6 more months or so I be happy to see her back to reevaluate her but I warned her that surgery is unlikely to be an option.  Surgery for mild degenerative changes in the neck especially in the face of global arthritic symptomatology is can be associated with poor surgical outcome but I be happy to revisit this in the future with repeat MRI to check for comparisons.    It was a pleasure to provide neurosurgical care  "

## 2022-05-20 NOTE — PATIENT INSTRUCTIONS
It is recommended that you see an interventional pain specialist to help manage your symptoms.

## 2022-06-15 NOTE — PROGRESS NOTES
"Chief Complaint: \"Neck pain and pain in my arms. Pain in my joints.\"       History of Present Illness:   Patient: Ms. Viviane Contreras, 52 y.o. female   Referring Physician: Dr. Nigel Flores   Reason for Referral: Consultation for chronic intractable posterior cervicalgia.   Pain History: Patient reports a 3-year history of progressive posterior cervicalgia, which began without incident. Patient complains of chronic axial mechanical pain that is increased by movement of the cervical spine. She complains of numbness, tingling, stiffness, and pain in her right hand, symptoms are worse during the nighttime. She had a surgery on her right trigger thumb release by Dr. Galvan a year ago.  She has been evaluated by neurology to Dr Glass and had an EMG/NCV.  EMG/NCV of the upper extremities was unrevealing. MRI of the cervical spine revealed degenerative changes at C5-C6 and C6-C7 with disc osteophyte complexes without significant canal or foraminal stenosis. Patient has failed to obtain pain relief with conservative measures for more than 3 months including oral analgesics, physical therapy (last year), chiropractic therapy (currently), to name a few. Viviane Mendosa underwent neurosurgical consultation with Dr. Nigel Flores on 05/20/2022, and was found not to be a surgical candidate. Pain has progressed in intensity over the past several months.   Pain Description: Constant right-sided posterior neck pain with intermittent exacerbation, described as dull, sharp, aching, and throbbing sensation.   Radiation of Pain: The pain does not radiate. She complains of tingling, stiffness, aching globally in her arms, particularly in her right hand  Pain intensity today: 3/10  Average pain intensity last week: 6/10  Pain intensity ranges from: 3/10 to 8/10  Aggravating factors: Pain sometimes increases with flexion, extension, rotation of the cervical spine   Alleviating factors: Pain decreases with heat, " analgesics, lying down  Associated Symptoms:   Patient reports pain, numbness, and weakness in the upper extremities, mostly right hand.   Patient denies any new bladder or bowel problems.   Patient denies difficulties with her balance or recent falls.   Patient reports bilateral cervicogenic and occipital headaches 2 x per month lasting hours.  Pain interferes with general activities and affects patient's quality of life  Pain interferes with sleep causing sleep fragmentation   Stiffness    Review of previous therapies and additional medical records:  Viviane Contreras has already failed the following measures, including:   Conservative Measures: Oral analgesics, topical analgesics, ice, heat, chiropractic therapy, acupuncture, massage, physical therapy   Interventional Measures: None  Surgical Measures: No history of previous cervical spine or shoulder surgery. 2021: Right trigger thumb release by Dr. Galvan   Viviane Contreras underwent neurosurgical consultation with Dr. Nigel Folres  on 05/20/2022, and was found not to be a surgical candidate.  Viviane Contreras is a healthy adult other than for asthma and GERD  In terms of current analgesics, Viviane Contreras takes: ibuprofen or naproxen or Tylenol  I have reviewed [facility] Report #[id number] (no CS) consistent with medication reconciliation.  SOAPP: Low Risk     PHQ-2 Depression Screening  Little interest or pleasure in doing things?  0   Feeling down, depressed, or hopeless?  0   PHQ-2 Total Score  0     Global Pain Scale 06-23  2022          Pain 15          Feelings 2          Clinical outcomes 8          Activities 0          GPS Total: 25            Review of Diagnostic Studies:  I have reviewed the images with the patient and used the images and a tridimensional spine model to explain findings. I have reviewed the report, as well.  MRI of the cervical spine without contrast on 3/25/2022 revealed vertebral body heights and  alignment are preserved.  The spinal cord caliber and signal are normal.  The visualized posterior fossa is normal.  Axial imaging:  C4-C5: Disc bulge.  No significant canal or foraminal stenosis  C5-C6, C6-C7: Posterior disc osteophyte complexes contributing to lateral recess stenosis.  No significant canal or foraminal stenosis  Cervical spine x-rays on 2/24/2022: Reversal of the normal cervical lordosis.  Spurring of the disc spaces C5-C6 and C6-C7  EMG/NCV by Dr. Berny Glass on 1/31/2022 revealed a normal right upper extremity.    Review of Systems   Musculoskeletal: Positive for back pain and neck pain.   All other systems reviewed and are negative.        Patient Active Problem List   Diagnosis   • Well adult exam   • Cervical disc disorder at C6-C7 level with radiculopathy   • Intervertebral disc stenosis of neural canal of cervical region   • DDD (degenerative disc disease), cervical   • Cervical spondylosis without myelopathy   • Polyarthralgia       Past Medical History:   Diagnosis Date   • Asthma     Childhood    • Cervical disc disorder 2021   • Low back pain 2019         Past Surgical History:   Procedure Laterality Date   • COLONOSCOPY      no polyps   • LEEP      cryo- Dr. Dee Tai; [facility]   • TRIGGER FINGER RELEASE Right 2021    Thumb; Dr. Jayson Ogden   • WISDOM TOOTH EXTRACTION           Family History   Problem Relation Age of Onset   • Diabetes Mother    • Polymyositis Mother    • Diabetes Father    • Heart disease Father    • Hypertension Father    • Hyperlipidemia Father    • Kidney failure Father         secondary to medications   • Breast cancer Maternal Grandmother          Social History     Socioeconomic History   • Marital status:    Tobacco Use   • Smoking status: Never Smoker   • Smokeless tobacco: Never Used   Vaping Use   • Vaping Use: Never used   Substance and Sexual Activity   • Alcohol use: Not Currently     Comment: Rare social drinker   • Drug use: Never   •  "Sexual activity: Yes     Partners: Male        Current Outpatient Medications:   •  Cholecalciferol (DIALYVITE VITAMIN D 5000 PO), Take 10,000 Units by mouth., Disp: , Rfl:   •  cyclobenzaprine (FLEXERIL) 10 MG tablet, , Disp: , Rfl:   •  ibuprofen (ADVIL,MOTRIN) 600 MG tablet, , Disp: , Rfl:   •  multivitamin with minerals tablet tablet, Take 1 tablet by mouth Daily., Disp: , Rfl:   •  Probiotic Product (PROBIOTIC ADVANCED PO), Take  by mouth., Disp: , Rfl:   •  Magnesium 500 MG capsule, Take  by mouth., Disp: , Rfl:       Allergies   Allergen Reactions   • Sulfa Antibiotics Rash     As a child         /83   Pulse 63   Temp 96.2 °F (35.7 °C)   Ht 167.6 cm (66\")   Wt 86.5 kg (190 lb 12.8 oz)   SpO2 98%   BMI 30.80 kg/m²       Physical Exam:  Constitutional: Patient appears well-developed, well-nourished, well-hydrated, appears younger than stated age  HEENT: Head: Normocephalic and atraumatic  Eyes: Conjunctivae and lids are normal  Pupils: Equal, round, reactive to light  Neck: Trachea normal. Neck supple. No JVD present.   Lymphatic: No cervical adenopathy  Musculoskeletal   Gait and station: Gait evaluation demonstrated a normal gait. Able to walk on heels, toes, tandem walking   Cervical spine: Passive and active range of motion are almost full and without significant pain. Extension, flexion, lateral flexion, rotation of the cervical spine did not increase or reproduce pain. Cervical facet joint loading maneuvers are negative.   Muscles: Presence of active trigger points: None  Shoulders: The range of motion of the glenohumeral joints is full and without pain. Rotator cuff strength is 5/5.   Neurological:   Patient is alert and oriented to person, place, and time.   Speech: Normal.   Cortical function: Normal mental status.   Reflex Scores:  Right brachioradialis: 2+  Left brachioradialis: 2+  Right biceps: 2+  Left biceps: 2+  Right triceps: 2+  Left triceps: 2+  Right patellar: 2+  Left patellar: " 2+  Right Achilles: 2+  Left Achilles: 2+  Motor strength: 5/5  Motor Tone: Normal  Involuntary movements: None.   Superficial/Primitive Reflexes: Primitive reflexes were absent.   Right Lei: Absent  Left Lei: Absent  Right ankle clonus: Absent  Left ankle clonus: Absent   Babinsky: Absent  Spurling sign: Negative. Neck tornado test: Negative. Lhermitte sign: Negative. Long tract signs: Negative.  Adson's maneuver and other tests for TOS all negative  Tinel's sign: Right Wrist, negative. Left wrist: negative. Right elbow: negative. Left elbow negative   Sensory exam: Intact to light touch, intact pain and temperature sensation, intact vibration sensation and normal proprioception.   Coordination: Normal finger to nose and heel to shin. Normal balance and negative Romberg's sign   Skin and subcutaneous tissue: Skin is warm and intact. No rash noted. No cyanosis.   Psychiatric: Judgment and insight: Normal. Recent and remote memory: Intact. Mood and affect: Normal.     ASSESSMENT:   1. Cervical disc disorder at C6-C7 level with radiculopathy    2. 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 DDD (degenerative disc disease), cervical    4. Cervical spondylosis without myelopathy    5. Polyarthralgia          PLAN/MEDICAL DECISION MAKING:  Ms. Viviane Contreras, 52 y.o. female presents with a 3-year history of posterior cervicalgia and upper extremity symptoms, which began without incident. Patient complains of chronic axial posterior neck pain and numbness, tingling, stiffness, and pain in her arms, particularly in her right hand, that are worse during the nighttime. She underwent right trigger thumb release by Dr. Galvan a year ago. She has been evaluated by neurology Dr Glass and had an EMG/NCV of the upper extremities that was unrevealing. MRI of the cervical spine revealed disc osteophyte complexes at C5-C6 and more prominent at C6-C7 contact with his thecal sac and  significant decrease in CSF signal with some lateral recess stenosis. Patient has failed to obtain pain relief with conservative measures for more than 3 months including oral analgesics, physical therapy (last year), chiropractic therapy (currently), to name a few. Viviane Mendosa underwent neurosurgical consultation with Dr. Nigel Flores on 05/20/2022, and was found not to be a surgical candidate. Pain has progressed in intensity over the past several months. A comprehensive initial evaluation including history and physical exam were performed including pertinent physiologic and functional assessment. Patient presents with intractable pain due to the diagnoses listed above. Patient has failed to respond to conservative modalities, as referenced under HPI including the impact of patient's moderate-to-severe pain contributing to significant impairment in daily activities, ADLs, and a negative impact on quality of life. Supporting diagnostic studies of patient's chronic pain condition have been reviewed. I have reviewed all available patient's medical records as well as previous therapies as referenced above. I had a lengthy conversation with Ms. Viviane Contreras regarding her chronic pain condition and potential therapeutic options including risks, benefits, alternative therapies, to name a few. We have discussed using a stepwise approach starting with the shortest or least intense level of treatment, care, or service as determined by the extent required to diagnose and or treat patient's condition. The treatments proposed are consistent with the patient's medical condition and are known to be as safe and effective by current guidelines and standard of care. There is no evidence of absolute contraindications for the proposed procedures under the current circumstances. These treatments are not considered experimental or investigational. The duration and frequency proposed are considered appropriate  for the service in accordance with accepted standards of medical practice for the diagnosis and or treatment of the patient's condition and or intended to improve the patient's level of function. These services will be furnished in a setting appropriate to the patient's medical needs and condition. Therefore, I have proposed the following plan:  1. Interventional pain management measures: Patient complains of posterior cervicalgia and bilateral upper extremity symptoms.  We discussed the possibility of a cervical epidural steroid injection by interlaminar approach versus diagnostic and therapeutic right C5-C6 and right C6-C7 transforaminal epidural steroid injections.  In the meantime, we will maximize conservative measures and proceed with additional work-up.  Patient can follow-up with us on an as-needed basis.  2. Diagnostic studies:  A.  Flexion and extension x-rays of the cervical spine to assess cervical spine stability  B.  If patient continues to struggle with pain, we may obtain MRI of the cervical spine with and without contrast versus cervical myelogram followed by CT post myelogram.  In addition, we could repeat electrodiagnostic studies  3. Pharmacological measures: Reviewed and discussed;   A. Patient takes ibuprofen or naproxen or Tylenol.   B.  I have discussed with the patient potential trials with gabapentin, Lyrica, Cymbalta, to name a few.  Patient has declined conventional pharmacological measures at this time.  C. Trial with Rheumate one tablet twice daily for one week, then continue once daily  D. Start pyridoxine 100 mg one tablet by mouth daily, #30, take for 30 days, no refills  E. Start alpha lipoid acid 2900-5213 mg per day divided into 3 doses  4. Long-term rehabilitation efforts:  A. The patient does not have a history of falls. I did complete a risk assessment for falls.   B. Patient will start a comprehensive physical therapy program at [facility] for upper body  strengthening/posture correction, neurodynamics, E-STIM, myofascial release, cupping, dry needling, posture/position body mechanics, home exercises  C. Home traction unit  D. Contrast therapy: Apply ice-packs for 15-20 minutes, followed by heating pads for 15-20 minutes to affected area   E. Viviane Contreras  reports that she has never smoked. She has never used smokeless tobacco.   5. Referral to rheumatology for polyarthralgia.  Supposedly, patient had a previous screening that was negative for rheumatological disorders.  All I found in her chart is a negative JORGE  6. The patient has been instructed to contact my office with any questions or difficulties. The patient understands the plan and agrees to proceed accordingly.    The patient has a documented plan of care to address chronic pain. Viviane Contreras reports a pain score of 3/10.  Given her pain assessment as noted, treatment options were discussed and the following options were decided upon as a follow-up plan to address the patient's pain: continuation of current treatment plan for pain, educational materials on pain management, home exercises and therapy, prescription for non-opiod analgesics, referral to Physical Therapy, steroid injections, use of non-medical modalities (ice, heat, stretching and/or behavior modifications) and Interventional pain management measures.             Pain Management Panel    There is no flowsheet data to display.          NHI query complete. NHI reviewed by Bubba Suarez MD.   Pain Medications             cyclobenzaprine (FLEXERIL) 10 MG tablet     ibuprofen (ADVIL,MOTRIN) 600 MG tablet         Please note that portions of this note were completed with a voice recognition program.     Bubba Suarez MD    Patient Care Team:  Tiffany Grimes DO as PCP - General (Family Medicine)  Bubba Suarez MD as Consulting Physician (Pain Medicine)     No orders of the defined types were placed in  this encounter.        Future Appointments   Date Time Provider Department Center   10/21/2022 10:15 AM Tiffany Grimes DO [facility]

## 2022-06-23 ENCOUNTER — OFFICE VISIT (OUTPATIENT)
Dept: PAIN MEDICINE | Facility: CLINIC | Age: 52
End: 2022-06-23

## 2022-06-23 VITALS
HEIGHT: 66 IN | SYSTOLIC BLOOD PRESSURE: 124 MMHG | DIASTOLIC BLOOD PRESSURE: 83 MMHG | OXYGEN SATURATION: 98 % | BODY MASS INDEX: 30.67 KG/M2 | TEMPERATURE: 96.2 F | WEIGHT: 190.8 LBS | HEART RATE: 63 BPM

## 2022-06-23 DIAGNOSIS — M47.812 CERVICAL SPONDYLOSIS WITHOUT MYELOPATHY: ICD-10-CM

## 2022-06-23 DIAGNOSIS — M50.123 CERVICAL DISC DISORDER AT C6-C7 LEVEL WITH RADICULOPATHY: Primary | ICD-10-CM

## 2022-06-23 DIAGNOSIS — M25.50 POLYARTHRALGIA: ICD-10-CM

## 2022-06-23 DIAGNOSIS — M99.51 INTERVERTEBRAL DISC STENOSIS OF NEURAL CANAL OF CERVICAL REGION: ICD-10-CM

## 2022-06-23 DIAGNOSIS — M50.123 CERVICAL DISC DISORDER AT C6-C7 LEVEL WITH RADICULOPATHY: ICD-10-CM

## 2022-06-23 DIAGNOSIS — M50.30 DDD (DEGENERATIVE DISC DISEASE), CERVICAL: ICD-10-CM

## 2022-06-23 PROCEDURE — 99204 OFFICE O/P NEW MOD 45 MIN: CPT | Performed by: ANESTHESIOLOGY

## 2022-06-23 RX ORDER — ST. JOHN'S WORT 300 MG
400 CAPSULE ORAL 3 TIMES DAILY
Qty: 180 CAPSULE | Refills: 5 | Status: SHIPPED | OUTPATIENT
Start: 2022-06-23

## 2022-06-23 RX ORDER — IBUPROFEN 600 MG/1
TABLET ORAL
COMMUNITY
Start: 2022-06-16

## 2022-06-23 RX ORDER — CYCLOBENZAPRINE HCL 10 MG
TABLET ORAL
COMMUNITY
Start: 2022-06-16

## 2022-06-23 RX ORDER — ME-TETRAHYDROFOLATE/B12/HRB236 1-1-500 MG
1 CAPSULE ORAL DAILY
Qty: 90 CAPSULE | Refills: 1 | Status: SHIPPED | OUTPATIENT
Start: 2022-06-23

## 2022-06-23 RX ORDER — MULTIVITAMIN WITH IRON
100 TABLET ORAL DAILY
Qty: 30 TABLET | Refills: 0 | Status: SHIPPED | OUTPATIENT
Start: 2022-06-23

## 2022-07-12 ENCOUNTER — HOSPITAL ENCOUNTER (OUTPATIENT)
Dept: GENERAL RADIOLOGY | Facility: HOSPITAL | Age: 52
Discharge: HOME | End: 2022-07-12
Admitting: ANESTHESIOLOGY

## 2022-07-12 DIAGNOSIS — M47.812 CERVICAL SPONDYLOSIS WITHOUT MYELOPATHY: ICD-10-CM

## 2022-07-12 DIAGNOSIS — M50.123 CERVICAL DISC DISORDER AT C6-C7 LEVEL WITH RADICULOPATHY: ICD-10-CM

## 2022-07-12 PROCEDURE — 72040 X-RAY EXAM NECK SPINE 2-3 VW: CPT

## 2023-03-08 NOTE — TELEPHONE ENCOUNTER
From: Viviane Contreras  To: Tiffany Grimes DO  Sent: 2/25/2022 8:14 AM EST  Subject: Follow up to our appt yesterday     Good Morning,  Thank you for taking the time with me yesterday. I was thinking more about the question you asked me about the pain and stiffness you asked me if my neck feels hot and it does. I was really paying attention to it yesterday last night and this morning. So often it’ll wake me up and my neck hole feel very warm and I will break out into a sweat when I move it. Also I will wake up with a headache. My skull. I noticed that from my sleep pattern. What I’m really concerned about is that I’m somehow pinching or injuring my spinal cord. .  I also see there’s some type of spurring or something happening I do hope that through the next testing we can get a clear picture about what exactly is happening and how to get in front of it for the future and address this. Thank you for your assistance. Viviane   Clear bilaterally.

## 2023-11-08 DIAGNOSIS — M47.812 CERVICAL SPONDYLOSIS WITHOUT MYELOPATHY: ICD-10-CM

## 2023-11-08 DIAGNOSIS — M50.123 CERVICAL DISC DISORDER AT C6-C7 LEVEL WITH RADICULOPATHY: Primary | ICD-10-CM

## 2023-12-04 ENCOUNTER — HOSPITAL ENCOUNTER (OUTPATIENT)
Dept: CARDIOLOGY | Facility: HOSPITAL | Age: 53
Discharge: HOME | End: 2023-12-04
Admitting: ANESTHESIOLOGY
Payer: COMMERCIAL

## 2023-12-04 VITALS — HEIGHT: 66 IN | BODY MASS INDEX: 29.73 KG/M2 | WEIGHT: 185 LBS

## 2023-12-04 DIAGNOSIS — M47.812 CERVICAL SPONDYLOSIS WITHOUT MYELOPATHY: ICD-10-CM

## 2023-12-04 DIAGNOSIS — M50.123 CERVICAL DISC DISORDER AT C6-C7 LEVEL WITH RADICULOPATHY: ICD-10-CM

## 2023-12-04 LAB
BH CV UPPER ARTERIAL LEFT 1ST DIGIT SYS MAX: 146
BH CV UPPER ARTERIAL LEFT FBI 1ST DIGIT RATIO: 0.99
BH CV UPPER ARTERIAL LEFT WBI RATIO: 1.18
BH CV UPPER ARTERIAL RIGHT 1ST DIGIT SYS MAX: 150
BH CV UPPER ARTERIAL RIGHT FBI 1ST DIGIT RATIO: 1.02
BH CV UPPER ARTERIAL RIGHT WBI RATIO: 1.05
UPPER ARTERIAL LEFT ARM BRACHIAL SYS MAX: 147
UPPER ARTERIAL LEFT ARM RADIAL SYS MAX: 158
UPPER ARTERIAL LEFT ARM ULNAR SYS MAX: 174
UPPER ARTERIAL RIGHT ARM BRACHIAL SYS MAX: 137
UPPER ARTERIAL RIGHT ARM RADIAL SYS MAX: 142
UPPER ARTERIAL RIGHT ARM ULNAR SYS MAX: 154

## 2023-12-04 PROCEDURE — 93923 UPR/LXTR ART STDY 3+ LVLS: CPT

## 2023-12-04 PROCEDURE — 93923 UPR/LXTR ART STDY 3+ LVLS: CPT | Performed by: INTERNAL MEDICINE

## 2023-12-06 ENCOUNTER — PATIENT MESSAGE (OUTPATIENT)
Dept: PAIN MEDICINE | Facility: CLINIC | Age: 53
End: 2023-12-06
Payer: COMMERCIAL

## 2024-01-02 ENCOUNTER — TELEPHONE (OUTPATIENT)
Dept: PAIN MEDICINE | Facility: CLINIC | Age: 54
End: 2024-01-02
Payer: COMMERCIAL

## 2024-01-02 NOTE — TELEPHONE ENCOUNTER
----- Message from Viviane Gar sent at 1/2/2024 11:09 AM EST -----  Regarding: Appointment Cancellation Request  Contact: [phone]  Viviane Gar would like to cancel the following appointments:    Jennifer Rosario in [facility] ([phone]), 1/11/2024  1:30 PM    Comments:  I would like to reschedule this appt please

## 2024-01-10 ENCOUNTER — TELEPHONE (OUTPATIENT)
Dept: PAIN MEDICINE | Facility: CLINIC | Age: 54
End: 2024-01-10
Payer: COMMERCIAL

## 2024-01-10 NOTE — TELEPHONE ENCOUNTER
Caller: Viviane Gar    Relationship: Self    Best call back number: [phone] (home)       What orders are you requesting (i.e. lab or imaging): MRI CERVICAL SPINE    In what timeframe would the patient need to come in: BEFORE 2-7-24 APPT      Where will you receive your lab/imaging services:      Additional notes: PATIENT CALLED IN WANTING TO KNOW IF SHE NEEDS TO GO AHEAD AND GET MRI OF CERVICAL BASED ON DR. BENITEZ RECOMMENDATION  AT HER LAST PROCEDURE. PATIENT IS ALSO WANTING THE IMAGING AND REPORT OF MRI AND XR FROM 2022

## 2024-01-18 NOTE — TELEPHONE ENCOUNTER
Left VM for patient to call the office so we can obtain more detail about her questions and concerns.

## 2024-01-29 ENCOUNTER — TELEPHONE (OUTPATIENT)
Dept: PAIN MEDICINE | Facility: CLINIC | Age: 54
End: 2024-01-29
Payer: COMMERCIAL

## 2024-01-29 NOTE — TELEPHONE ENCOUNTER
Patient called and wants to know if she needs to get a cervical MRI based upon Dr. Suarez's 6/23/2023 note, and if so, she would like to r/s her 2/7/2024 appointment with you until after it is done. Please advise

## 2024-02-08 NOTE — TELEPHONE ENCOUNTER
Called the patietn and left VM letting her know that Jennifer NguyenRosario wanted me to let her know that she will address all of her questions and concerns during her office visit 3/12/2024.